# Patient Record
Sex: FEMALE
[De-identification: names, ages, dates, MRNs, and addresses within clinical notes are randomized per-mention and may not be internally consistent; named-entity substitution may affect disease eponyms.]

---

## 2020-12-08 PROBLEM — Z00.00 ENCOUNTER FOR PREVENTIVE HEALTH EXAMINATION: Status: ACTIVE | Noted: 2020-12-08

## 2020-12-31 ENCOUNTER — APPOINTMENT (OUTPATIENT)
Dept: NEUROLOGY | Facility: CLINIC | Age: 75
End: 2020-12-31

## 2021-01-06 ENCOUNTER — APPOINTMENT (OUTPATIENT)
Dept: NEUROLOGY | Facility: CLINIC | Age: 76
End: 2021-01-06
Payer: MEDICARE

## 2021-01-06 DIAGNOSIS — Z86.79 PERSONAL HISTORY OF OTHER DISEASES OF THE CIRCULATORY SYSTEM: ICD-10-CM

## 2021-01-06 DIAGNOSIS — Z86.39 PERSONAL HISTORY OF OTHER ENDOCRINE, NUTRITIONAL AND METABOLIC DISEASE: ICD-10-CM

## 2021-01-06 DIAGNOSIS — Z78.9 OTHER SPECIFIED HEALTH STATUS: ICD-10-CM

## 2021-01-06 DIAGNOSIS — Z80.9 FAMILY HISTORY OF MALIGNANT NEOPLASM, UNSPECIFIED: ICD-10-CM

## 2021-01-06 DIAGNOSIS — R06.83 SNORING: ICD-10-CM

## 2021-01-06 DIAGNOSIS — Z83.3 FAMILY HISTORY OF DIABETES MELLITUS: ICD-10-CM

## 2021-01-06 PROCEDURE — 99204 OFFICE O/P NEW MOD 45 MIN: CPT

## 2021-01-06 PROCEDURE — 99072 ADDL SUPL MATRL&STAF TM PHE: CPT

## 2021-01-06 RX ORDER — TRAZODONE HYDROCHLORIDE 50 MG/1
50 TABLET ORAL
Qty: 30 | Refills: 2 | Status: ACTIVE | COMMUNITY
Start: 2021-01-06 | End: 1900-01-01

## 2021-01-06 NOTE — HISTORY OF PRESENT ILLNESS
[FreeTextEntry1] : Ms. Cooepr is here today for sleep evaluation.\par [employer], [place] [id number], assisted with the visit.\par \par She reports having difficulty sleeping.\par She goes to bed at 10 PM. She lies in bed and she is thinking and thinking. She thinks that she finally falls asleep at about 5 AM and she sleeps for one hour. \par When she does sleep she feels as if she is dreaming the whole time.\par She gets out of bed at about 7 AM.\par She rarely sleeps for 30-60 minutes during the day.\par She has milk with a little bit of coffee in the morning.\par Sometimes she takes NyQuil. It does not help a lot.\par She sometimes feels anxious. \par \par She has been told that she snores. It can be loud.\par She is aware of having snort arousals. \par She has not been told that she has pauses in her breathing during sleep.\par She wakes up with a dry mouth.\par \par She denies having any symptoms of Restless legs Syndrome.\par \par There is no history of dream enactment behavior, hypnagogic/hypnopompic hallucinations, sleep paralysis or cataplexy.\par She used to have some visual hallucinations during the day but these resolved. \par \par She saw a neurologist, Dr. Dillon Rodríguez, in the past. He is no longer working.

## 2021-01-06 NOTE — CONSULT LETTER
[Dear  ___] : Dear ~MEDINA, [Consult Letter:] : I had the pleasure of evaluating your patient, [unfilled]. [Please see my note below.] : Please see my note below. [Consult Closing:] : Thank you very much for allowing me to participate in the care of this patient.  If you have any questions, please do not hesitate to contact me. [FreeTextEntry2] : Lacey Love, RPA [FreeTextEntry3] : Sincerely,\par \par \par Kiki Pearl MD\par Diplomate, American Academy of Psychiatry and Neurology\par Board Certified in the Subspecialty of Clinical Neurophysiology\par Board Certified in the Subspecialty of Sleep Medicine\par Board Certified in the Subspecialty of Epilepsy\par

## 2021-01-06 NOTE — PHYSICAL EXAM
[FreeTextEntry1] : Examination:\par Constitutional: normal, no apparent distress\par oropharynx: low lying palate\par Eyes: normal conjunctiva b/l, no ptosis, visual fields full\par Respiratory: no respiratory distress, normal effort, normal auscultation\par Cardiovascular: normal rate, rhythm, no murmurs\par Neck: supple, no masses\par Vascular: carotids normal\par Skin: normal color, no rashes\par Psych: normal mood, affect\par \par Neurological:\par Memory: normal memory, oriented to person, place, time\par Language intact/no aphasia\par Cranial Nerves: II-XII intact, Pupils equally round and reactive to light, ocular muscles/movements intact, no ptosis, no facial weakness, tongue protrudes normally in the midline, \par Motor: normal tone, no pronator drift, full strength in all four extremities in the proximal and distal muscle groups\par Coordination: Fine motor movements intact, rapid alternating movements intact, finger to nose intact bilaterally\par Sensory: intact to light touch\par DTRs: symmetric, 2+ in b/l triceps, 2+ in b/l biceps, 2+ in b/l brachioradialis, 2+ in bilateral patellars,\par Gait: narrow based, steady\par \par

## 2021-01-06 NOTE — REVIEW OF SYSTEMS
[As Noted in HPI] : as noted in HPI [Sleep Disturbances] : sleep disturbances [Negative] : Musculoskeletal [FreeTextEntry5] : fainting

## 2021-01-06 NOTE — DISCUSSION/SUMMARY
[FreeTextEntry1] : Ms. Cooper is a 75 year old woman with chronic difficulty with sleep.\par \par She was referred from [facility] in [place] ([phone]) where a message was left for NOHEMY Pardo to clarify the reason for referral. The referral form states "depression".\par Ms. Cooper denies depression and we focused on her sleep.\par \par Records will also requested from a  neurologist, Dr. Dillon Peterson who she saw previously.\par \par She has been put on Donepezil, so I am interested if a workup for dementia was previously done as she seems to have some cognitive impairment.\par \par In terms of insomnia, we discussed sleep hygiene.\par We discussed the following recommendations to improve sleep hygiene and insomnia.\par - The bed should only be used for sleep and intimacy. No reading or watching television in bed.\par -  Avoid trying to sleep/go to bed only when sleepy. If you cannot fall asleep at the beginning of the night or in the middle of the night get out of bed after 15 minutes and do something relaxing (read, watch television, etc.)\par -  Wake up at the same time every day.\par -  No naps during the day\par -  No caffeine after noon \par -  Do not watch the clock at night.\par - Avoid direct sunlight late in the day/ wear sunglasses after 4 PM\par - Do not use the computer/tablets for 1-2 hours prior to bedtime\par - Schedule thinking time early in the evening and have relaxation time for one hour before bed\par - Practice relaxation training that can be done at night if you cannot sleep\par - Exercise for 20 minutes in the late afternoon/early evening or take a hot bath 2-4 hours before bedtime\par \par These recommendations were given to her in written form as well (in English for a family member to reinforce with her).\par \par I am also prescribing a trial of Trazodone 50 mg qhs.\par \par There is also some suspicion of obstructive sleep apnea with her history of snoring.\par I will refer her for a home sleep study, but will wait until after her sleep time is improved.\par \par I will follow up with Mr. Cooper in 1-2 months, sooner if needed.

## 2021-02-25 ENCOUNTER — APPOINTMENT (OUTPATIENT)
Dept: NEUROLOGY | Facility: CLINIC | Age: 76
End: 2021-02-25
Payer: MEDICARE

## 2021-02-25 PROCEDURE — 99072 ADDL SUPL MATRL&STAF TM PHE: CPT

## 2021-02-25 PROCEDURE — 95806 SLEEP STUDY UNATT&RESP EFFT: CPT

## 2021-03-09 ENCOUNTER — APPOINTMENT (OUTPATIENT)
Dept: NEUROLOGY | Facility: CLINIC | Age: 76
End: 2021-03-09
Payer: MEDICARE

## 2021-03-09 VITALS
HEIGHT: 60 IN | TEMPERATURE: 97.2 F | WEIGHT: 135 LBS | DIASTOLIC BLOOD PRESSURE: 70 MMHG | HEART RATE: 81 BPM | BODY MASS INDEX: 26.5 KG/M2 | SYSTOLIC BLOOD PRESSURE: 112 MMHG

## 2021-03-09 DIAGNOSIS — G47.00 INSOMNIA, UNSPECIFIED: ICD-10-CM

## 2021-03-09 DIAGNOSIS — G47.33 OBSTRUCTIVE SLEEP APNEA (ADULT) (PEDIATRIC): ICD-10-CM

## 2021-03-09 PROCEDURE — 99072 ADDL SUPL MATRL&STAF TM PHE: CPT

## 2021-03-09 PROCEDURE — 99213 OFFICE O/P EST LOW 20 MIN: CPT

## 2021-03-09 NOTE — HISTORY OF PRESENT ILLNESS
[FreeTextEntry1] : I last saw Ms. Cooper on 1/6/21.\par [employer] was used for communication during the visit (ID [id number])\par \par She had a home sleep study on 2/25/21 which showed a moderate degree of BONNY with an AHI of 16.3. The non-supine AHI was 1.5 but she only spent 39 minutes in the non-supine position).\par \par She continues to report that she is always thinking and feels as if she is always dreaming when she goes to sleep.\par \par She says that she only took Trazodone once and nothing changed so she stopped. \par

## 2021-03-09 NOTE — DATA REVIEWED
[de-identified] : Home sleep study 2/25/21: AHI 16.3 Non-supine AHI 1.5 (only 39 minutes spent in the non-supine cancelled).

## 2021-03-09 NOTE — DISCUSSION/SUMMARY
[FreeTextEntry1] : Ms. Cooper is a 75 year old woman with chronic difficulty with sleep.\par \par She was referred from [facility] in [place] ([phone]) where a message was left for NOHEMY Pardo to clarify the reason for referral. \par I never received a call back or records from her previous neurologist, Dr. Dillon Peterson.\par \par She had a home sleep study in February 2021 showing evidence of moderate BONNY with an AHI of 16.3.\par \par We discussed the risks of untreated sleep apnea including sleepiness, hypertension, increased risk for heart disease and stroke, worsening seizure frequency and cognitive impairment.\par We discussed different treatments for BONNY including CPAP, oral appliance, surgery and weight loss. \par \par I suggest a trial of auto PAP. \par At this point she is refusing.\par \par She reports that she would like to see a physician who is closer to her home since she has transportation issues. \par \par I explained that she may be more aware of dreams because her sleep is disrupted due to sleep apnea and she is waking up during REM. \par She only tried Trazodone once and did not see an improvement.\par Encouraged to try again. \par \par I will supply her with today's note and a copy of the sleep study which she can keep in her records.\par She would like to consult with a neurologist closer to her home. \par \par  \par

## 2021-03-09 NOTE — PHYSICAL EXAM
[FreeTextEntry1] : Examination:\par Constitutional: normal, no apparent distress\par Eyes: normal conjunctiva b/l, no ptosis, visual fields full\par Respiratory: no respiratory distress, normal effort, normal auscultation\par Cardiovascular: normal rate, rhythm, no murmurs\par Neck: supple, no masses\par Vascular: carotids normal\par Skin: normal color, no rashes\par Psych: normal mood, affect\par \par Neurological:\par Language intact/no aphasia\par Cranial Nerves: II-XII intact, Pupils equally round and reactive to light, ocular muscles/movements intact, no ptosis, no facial weakness, tongue protrudes normally in the midline, \par Motor: normal tone, no pronator drift, full strength in all four extremities in the proximal and distal muscle groups\par Coordination: Fine motor movements intact, rapid alternating movements intact, finger to nose intact bilaterally\par Sensory: intact to light touch\par DTRs: symmetric, normal\par Gait: narrow based, steady\par

## 2022-03-10 ENCOUNTER — APPOINTMENT (OUTPATIENT)
Dept: CARDIOLOGY | Facility: CLINIC | Age: 77
End: 2022-03-10

## 2024-05-24 ENCOUNTER — APPOINTMENT (OUTPATIENT)
Dept: UROLOGY | Facility: CLINIC | Age: 79
End: 2024-05-24
Payer: MEDICARE

## 2024-05-24 VITALS
OXYGEN SATURATION: 98 % | HEIGHT: 60 IN | RESPIRATION RATE: 16 BRPM | TEMPERATURE: 98 F | BODY MASS INDEX: 26.5 KG/M2 | HEART RATE: 65 BPM | SYSTOLIC BLOOD PRESSURE: 135 MMHG | WEIGHT: 135 LBS | DIASTOLIC BLOOD PRESSURE: 61 MMHG

## 2024-05-24 DIAGNOSIS — R82.71 BACTERIURIA: ICD-10-CM

## 2024-05-24 DIAGNOSIS — Z80.42 FAMILY HISTORY OF MALIGNANT NEOPLASM OF PROSTATE: ICD-10-CM

## 2024-05-24 PROCEDURE — 99204 OFFICE O/P NEW MOD 45 MIN: CPT

## 2024-05-24 PROCEDURE — 51798 US URINE CAPACITY MEASURE: CPT

## 2024-05-24 PROCEDURE — G2211 COMPLEX E/M VISIT ADD ON: CPT

## 2024-05-24 RX ORDER — ATORVASTATIN CALCIUM 10 MG/1
10 TABLET, FILM COATED ORAL
Refills: 0 | Status: ACTIVE | COMMUNITY

## 2024-05-24 RX ORDER — SITAGLIPTIN AND METFORMIN HYDROCHLORIDE 50; 1000 MG/1; MG/1
50-1000 TABLET, FILM COATED ORAL
Refills: 0 | Status: ACTIVE | COMMUNITY

## 2024-05-24 RX ORDER — CALCIUM CARBONATE/VITAMIN D3 500MG-5MCG
TABLET ORAL
Refills: 0 | Status: ACTIVE | COMMUNITY

## 2024-05-24 RX ORDER — PANTOPRAZOLE 40 MG/1
40 TABLET, DELAYED RELEASE ORAL
Refills: 0 | Status: ACTIVE | COMMUNITY

## 2024-05-24 RX ORDER — CLOPIDOGREL BISULFATE 75 MG/1
75 TABLET, FILM COATED ORAL
Refills: 0 | Status: ACTIVE | COMMUNITY

## 2024-05-24 RX ORDER — METOPROLOL TARTRATE 50 MG/1
50 TABLET, FILM COATED ORAL
Refills: 0 | Status: ACTIVE | COMMUNITY

## 2024-05-24 NOTE — ASSESSMENT
[FreeTextEntry1] : 78-year-old female who presents for asymptomatic bacteria  We will send her urine today for urinalysis and urine culture.  Explained though that there is nothing to do for asymptomatic bacteria.  She should only be treated if she has symptoms of a UTI which include burning, frequency, urgency.  We also discussed the impact of uncontrolled diabetes on bladder health.  Explained that this can lead to urinary symptoms or UTI.  Lastly we discussed that adequate water intake is a proven way to prevent infections in the future.  Will call the patient with the results of the urinalysis and urine culture Return as needed

## 2024-05-24 NOTE — REASON FOR VISIT
[TextEntry] : 78-year-old female who presents for asymptomatic bacteria  Patient reports that she was sent by her PCP for asymptomatic bacteriuria.  She denies any urinary symptoms.  She does occasionally she experiences urinary frequency but otherwise she has nothing to complain about.  She does not recall ever having a UTI.  She denies any issues with bladder emptying.  She denies any issues with leakage.  She denies any history of urologic procedures.  She denies any gross hematuria.    She is a  via vaginal delivery.  She denies any vaginal symptoms or bulge.  She has a history of a hysterectomy and tubal ligation.  She denies any bowel issues.  She does have diabetes that is not well-controlled.  She says her sugars are generally in the 200 range.  Bladder scan 165 with her last void 3 hours ago Outside records reviewed Ucx e.coli

## 2024-05-24 NOTE — PHYSICAL EXAM
[Normal Appearance] : normal appearance [Well Groomed] : well groomed [Abdomen Soft] : soft [Abdomen Tenderness] : non-tender [Urinary Bladder Findings] : the bladder was normal on palpation [Normal Station and Gait] : the gait and station were normal for the patient's age [] : no rash [No Focal Deficits] : no focal deficits [Oriented To Time, Place, And Person] : oriented to person, place, and time [Affect] : the affect was normal [Mood] : the mood was normal

## 2024-05-28 LAB
APPEARANCE: ABNORMAL
BACTERIA UR CULT: ABNORMAL
BACTERIA: ABNORMAL /HPF
BILIRUBIN URINE: NEGATIVE
BLOOD URINE: ABNORMAL
CAST: 2 /LPF
COLOR: YELLOW
EPITHELIAL CELLS: 2 /HPF
GLUCOSE QUALITATIVE U: NEGATIVE MG/DL
KETONES URINE: NEGATIVE MG/DL
LEUKOCYTE ESTERASE URINE: ABNORMAL
MICROSCOPIC-UA: NORMAL
NITRITE URINE: POSITIVE
PH URINE: 6.5
PROTEIN URINE: NORMAL MG/DL
RED BLOOD CELLS URINE: 2 /HPF
REVIEW: NORMAL
SPECIFIC GRAVITY URINE: 1.02
UROBILINOGEN URINE: 0.2 MG/DL
WHITE BLOOD CELLS URINE: >998 /HPF